# Patient Record
Sex: MALE | Race: ASIAN
[De-identification: names, ages, dates, MRNs, and addresses within clinical notes are randomized per-mention and may not be internally consistent; named-entity substitution may affect disease eponyms.]

---

## 2021-01-20 ENCOUNTER — HOSPITAL ENCOUNTER (EMERGENCY)
Dept: HOSPITAL 5 - ED | Age: 41
Discharge: LEFT BEFORE DISCHARGE | End: 2021-01-20
Payer: COMMERCIAL

## 2021-01-20 VITALS — DIASTOLIC BLOOD PRESSURE: 52 MMHG | SYSTOLIC BLOOD PRESSURE: 106 MMHG

## 2021-01-20 DIAGNOSIS — R07.89: Primary | ICD-10-CM

## 2021-01-20 DIAGNOSIS — Z53.21: ICD-10-CM

## 2021-01-20 NOTE — EMERGENCY DEPARTMENT REPORT
- General


Chief complaint: Skin/Abscess/Foreign Body


Stated complaint: LUMP ON CHEST;PAINFUL


Time Seen by Provider: 01/20/21 10:36


Source: patient, RN notes reviewed


Limitations: No Limitations





- History of Present Illness


Initial comments: 





This is a 40-year-old male nontoxic, well nourished in appearance, no acute 

signs of distress presents to the ED with c/o of a lump to chest area x 38 

years. 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  Came in today because it 

was hurting this morning pt resolved now,.  Currently patient denies any pain.  

Denies any redness. Patient denies any pus or drainage.  Patient denies any 

fever, chills, nausea, vomiting, chest pain, shortness of breath, headache or 

stiff neck.  Patient denies any allergies or significant past medical history.


-: year(s)


Location: chest


Severity scale (0 -10): 0


Improves with: none


Worsens with: none


Context: none


Associated symptoms: denies other symptoms


Treatments Prior to Arrival: none





- Related Data


                                    Allergies











Allergy/AdvReac Type Severity Reaction Status Date / Time


 


No Known Allergies Allergy   Unverified 01/20/21 10:41














Abscess Boil HPI





- HPI


Stated Complaint: LUMP ON CHEST;PAINFUL


Time Seen by Provider: 01/20/21 10:36


Allergies/Adverse Reactions: 


                                    Allergies











Allergy/AdvReac Type Severity Reaction Status Date / Time


 


No Known Allergies Allergy   Unverified 01/20/21 10:41














ED Review of Systems


ROS: 


Stated complaint: LUMP ON CHEST;PAINFUL


Other details as noted in HPI





Comment: All other systems reviewed and negative


Constitutional: denies: chills, fever


Eyes: denies: eye pain, eye discharge, vision change


ENT: denies: ear pain, throat pain


Respiratory: denies: cough, shortness of breath, wheezing


Cardiovascular: denies: chest pain, palpitations


Endocrine: no symptoms reported


Gastrointestinal: denies: abdominal pain, nausea, diarrhea


Genitourinary: denies: urgency, dysuria


Musculoskeletal: denies: back pain, joint swelling, arthralgia


Skin: denies: rash, lesions


Neurological: denies: headache, weakness, paresthesias


Psychiatric: denies: anxiety, depression


Hematological/Lymphatic: denies: easy bleeding, easy bruising





ED Physical Exam





- General


General appearance: alert, in no apparent distress





- Head


Head exam: Present: atraumatic, normocephalic





- Eye


Eye exam: Present: normal appearance





- Neck


Neck exam: Present: normal inspection, full ROM





- Respiratory


Respiratory exam: Absent: respiratory distress





- Cardiovascular


Cardiovascular Exam: Present: regular rate, normal rhythm, normal heart sounds, 

other (nodular mobile mass with no tenderness, redness, induration or fluctuan

ce.  No signs of cellulitis or abscess.).  Absent: bradycardia, tachycardia, 

irregular rhythm





- Extremities Exam


Extremities exam: Present: full ROM





- Back Exam


Back exam: Present: full ROM





- Neurological Exam


Neurological exam: Present: alert, oriented X3, normal gait





- Psychiatric


Psychiatric exam: Present: normal affect, normal mood





- Skin


Skin exam: Present: warm, dry, intact, normal color.  Absent: rash





ED Course


                                   Vital Signs











  01/20/21





  10:45


 


Temperature 98.2 F


 


Pulse Rate 62


 


Respiratory 18





Rate 


 


Blood Pressure 106/52


 


O2 Sat by Pulse 97





Oximetry 














- Reevaluation(s)


Reevaluation #1: 





01/20/21 10:43


Patient is speaking in full sentences with no signs of distress noted.





ED Medical Decision Making





- Medical Decision Making





Patient presents with a nodular cyst to the chest area.  Patient is stable and 

was examined by me.  Vital signs are stable.  Exam is unremarkable.  No signs of

cellulitis or abscess formation.  This seems like it is a chronic condition so 

patient has been referred to a primary care doctor for further evaluation and t

reatment.  At time of discharge, the patient does not seem toxic or ill in 

appearance.  No acute signs of distress noted.  Patient agrees to discharge 

treatment plan of care.  No further questions noted by the patient.


Critical care attestation.: 


If time is entered above; I have spent that time in minutes in the direct care 

of this critically ill patient, excluding procedure time.








ED Disposition


Clinical Impression: 


 Epidermoid cyst of skin of chest





Disposition: Z-07 MED SCREENING EXAM-LEFT


Is pt being admited?: No


Does the pt Need Aspirin: No


Condition: Stable


Instructions:  Epidermal Cyst Removal, Care After, Epidermal Cyst, Easy-to-Read


Additional Instructions: 


Follow-up with a primary care doctor in 3-5 days or if symptoms worsen and 

continue return to emergency room as soon as possible. 


Referrals: 


LOBO LEHMAN MD [Primary Care Provider] - 3-5 Days


ANTONIO MOHAMUD MD [Staff Physician] - 3-5 Days


Time of Disposition: 10:55